# Patient Record
Sex: MALE | Race: WHITE | Employment: FULL TIME | URBAN - METROPOLITAN AREA
[De-identification: names, ages, dates, MRNs, and addresses within clinical notes are randomized per-mention and may not be internally consistent; named-entity substitution may affect disease eponyms.]

---

## 2019-07-08 ENCOUNTER — ANESTHESIA EVENT (OUTPATIENT)
Dept: SURGERY | Age: 59
End: 2019-07-08
Payer: COMMERCIAL

## 2019-07-09 ENCOUNTER — HOSPITAL ENCOUNTER (OUTPATIENT)
Age: 59
Setting detail: OUTPATIENT SURGERY
Discharge: HOME | End: 2019-07-09
Attending: ORTHOPAEDIC SURGERY | Admitting: ORTHOPAEDIC SURGERY
Payer: COMMERCIAL

## 2019-07-09 ENCOUNTER — ANESTHESIA (OUTPATIENT)
Dept: SURGERY | Age: 59
End: 2019-07-09
Payer: COMMERCIAL

## 2019-07-09 VITALS
WEIGHT: 215 LBS | DIASTOLIC BLOOD PRESSURE: 91 MMHG | HEART RATE: 55 BPM | TEMPERATURE: 97.7 F | RESPIRATION RATE: 15 BRPM | HEIGHT: 74 IN | SYSTOLIC BLOOD PRESSURE: 167 MMHG | BODY MASS INDEX: 27.59 KG/M2 | OXYGEN SATURATION: 95 %

## 2019-07-09 DIAGNOSIS — L76.82 PAIN AT SURGICAL INCISION: Primary | ICD-10-CM

## 2019-07-09 PROCEDURE — 77030003666 HC NDL SPINAL BD -A: Performed by: ORTHOPAEDIC SURGERY

## 2019-07-09 PROCEDURE — A4565 SLINGS: HCPCS | Performed by: ORTHOPAEDIC SURGERY

## 2019-07-09 PROCEDURE — 77030004451 HC BUR SHV S&N -B: Performed by: ORTHOPAEDIC SURGERY

## 2019-07-09 PROCEDURE — 74011250636 HC RX REV CODE- 250/636: Performed by: ORTHOPAEDIC SURGERY

## 2019-07-09 PROCEDURE — 76942 ECHO GUIDE FOR BIOPSY: CPT | Performed by: ORTHOPAEDIC SURGERY

## 2019-07-09 PROCEDURE — 76010010054 HC POST OP PAIN BLOCK: Performed by: ORTHOPAEDIC SURGERY

## 2019-07-09 PROCEDURE — 77030008462 HC STPLR SKN PROX J&J -A: Performed by: ORTHOPAEDIC SURGERY

## 2019-07-09 PROCEDURE — 77030010509 HC AIRWY LMA MSK TELE -A: Performed by: ANESTHESIOLOGY

## 2019-07-09 PROCEDURE — 74011000250 HC RX REV CODE- 250

## 2019-07-09 PROCEDURE — 76060000032 HC ANESTHESIA 0.5 TO 1 HR: Performed by: ORTHOPAEDIC SURGERY

## 2019-07-09 PROCEDURE — 77030018836 HC SOL IRR NACL ICUM -A: Performed by: ORTHOPAEDIC SURGERY

## 2019-07-09 PROCEDURE — 74011250636 HC RX REV CODE- 250/636: Performed by: ANESTHESIOLOGY

## 2019-07-09 PROCEDURE — 76210000063 HC OR PH I REC FIRST 0.5 HR: Performed by: ORTHOPAEDIC SURGERY

## 2019-07-09 PROCEDURE — 77030006891 HC BLD SHV RESECT STRY -B: Performed by: ORTHOPAEDIC SURGERY

## 2019-07-09 PROCEDURE — 77030033005 HC TBNG ARTHSC PMP STRY -B: Performed by: ORTHOPAEDIC SURGERY

## 2019-07-09 PROCEDURE — 77030002916 HC SUT ETHLN J&J -A: Performed by: ORTHOPAEDIC SURGERY

## 2019-07-09 PROCEDURE — 76010000160 HC OR TIME 0.5 TO 1 HR INTENSV-TIER 1: Performed by: ORTHOPAEDIC SURGERY

## 2019-07-09 PROCEDURE — 77030003602 HC NDL NRV BLK BBMI -B: Performed by: ANESTHESIOLOGY

## 2019-07-09 PROCEDURE — 74011250636 HC RX REV CODE- 250/636

## 2019-07-09 PROCEDURE — 77030027384 HC PRB ARTHSCP SERFAS STRY -C: Performed by: ORTHOPAEDIC SURGERY

## 2019-07-09 PROCEDURE — 77030032490 HC SLV COMPR SCD KNE COVD -B: Performed by: ORTHOPAEDIC SURGERY

## 2019-07-09 PROCEDURE — 77030006868 HC BLD SHV AUG STRY -B: Performed by: ORTHOPAEDIC SURGERY

## 2019-07-09 PROCEDURE — 74011250637 HC RX REV CODE- 250/637: Performed by: ANESTHESIOLOGY

## 2019-07-09 PROCEDURE — 76210000020 HC REC RM PH II FIRST 0.5 HR: Performed by: ORTHOPAEDIC SURGERY

## 2019-07-09 RX ORDER — FENTANYL CITRATE 50 UG/ML
100 INJECTION, SOLUTION INTRAMUSCULAR; INTRAVENOUS ONCE
Status: COMPLETED | OUTPATIENT
Start: 2019-07-09 | End: 2019-07-09

## 2019-07-09 RX ORDER — LIDOCAINE HYDROCHLORIDE 20 MG/ML
INJECTION, SOLUTION EPIDURAL; INFILTRATION; INTRACAUDAL; PERINEURAL AS NEEDED
Status: DISCONTINUED | OUTPATIENT
Start: 2019-07-09 | End: 2019-07-09 | Stop reason: HOSPADM

## 2019-07-09 RX ORDER — MIDAZOLAM HYDROCHLORIDE 1 MG/ML
2 INJECTION, SOLUTION INTRAMUSCULAR; INTRAVENOUS
Status: DISCONTINUED | OUTPATIENT
Start: 2019-07-09 | End: 2019-07-09 | Stop reason: HOSPADM

## 2019-07-09 RX ORDER — PRAVASTATIN SODIUM 10 MG/1
TABLET ORAL
COMMUNITY

## 2019-07-09 RX ORDER — MIDAZOLAM HYDROCHLORIDE 1 MG/ML
2 INJECTION, SOLUTION INTRAMUSCULAR; INTRAVENOUS ONCE
Status: COMPLETED | OUTPATIENT
Start: 2019-07-09 | End: 2019-07-09

## 2019-07-09 RX ORDER — PROPOFOL 10 MG/ML
INJECTION, EMULSION INTRAVENOUS AS NEEDED
Status: DISCONTINUED | OUTPATIENT
Start: 2019-07-09 | End: 2019-07-09 | Stop reason: HOSPADM

## 2019-07-09 RX ORDER — CEFAZOLIN SODIUM/WATER 2 G/20 ML
2 SYRINGE (ML) INTRAVENOUS ONCE
Status: COMPLETED | OUTPATIENT
Start: 2019-07-09 | End: 2019-07-09

## 2019-07-09 RX ORDER — SODIUM CHLORIDE, SODIUM LACTATE, POTASSIUM CHLORIDE, CALCIUM CHLORIDE 600; 310; 30; 20 MG/100ML; MG/100ML; MG/100ML; MG/100ML
25 INJECTION, SOLUTION INTRAVENOUS CONTINUOUS
Status: DISCONTINUED | OUTPATIENT
Start: 2019-07-09 | End: 2019-07-09 | Stop reason: HOSPADM

## 2019-07-09 RX ORDER — EPINEPHRINE 1 MG/ML
INJECTION INTRAMUSCULAR; INTRAVENOUS; SUBCUTANEOUS AS NEEDED
Status: DISCONTINUED | OUTPATIENT
Start: 2019-07-09 | End: 2019-07-09 | Stop reason: HOSPADM

## 2019-07-09 RX ORDER — HYDROMORPHONE HYDROCHLORIDE 2 MG/ML
0.5 INJECTION, SOLUTION INTRAMUSCULAR; INTRAVENOUS; SUBCUTANEOUS
Status: DISCONTINUED | OUTPATIENT
Start: 2019-07-09 | End: 2019-07-09 | Stop reason: HOSPADM

## 2019-07-09 RX ORDER — ROPIVACAINE HYDROCHLORIDE 5 MG/ML
INJECTION, SOLUTION EPIDURAL; INFILTRATION; PERINEURAL
Status: COMPLETED | OUTPATIENT
Start: 2019-07-09 | End: 2019-07-09

## 2019-07-09 RX ORDER — ONDANSETRON 2 MG/ML
INJECTION INTRAMUSCULAR; INTRAVENOUS AS NEEDED
Status: DISCONTINUED | OUTPATIENT
Start: 2019-07-09 | End: 2019-07-09 | Stop reason: HOSPADM

## 2019-07-09 RX ORDER — ONDANSETRON 2 MG/ML
4 INJECTION INTRAMUSCULAR; INTRAVENOUS
Status: DISCONTINUED | OUTPATIENT
Start: 2019-07-09 | End: 2019-07-09 | Stop reason: HOSPADM

## 2019-07-09 RX ORDER — OXYCODONE HYDROCHLORIDE 5 MG/1
5 TABLET ORAL
Status: COMPLETED | OUTPATIENT
Start: 2019-07-09 | End: 2019-07-09

## 2019-07-09 RX ORDER — NALOXONE HYDROCHLORIDE 0.4 MG/ML
0.2 INJECTION, SOLUTION INTRAMUSCULAR; INTRAVENOUS; SUBCUTANEOUS
Status: DISCONTINUED | OUTPATIENT
Start: 2019-07-09 | End: 2019-07-09 | Stop reason: HOSPADM

## 2019-07-09 RX ORDER — NALOXONE HYDROCHLORIDE 0.4 MG/ML
0.2 INJECTION, SOLUTION INTRAMUSCULAR; INTRAVENOUS; SUBCUTANEOUS AS NEEDED
Status: DISCONTINUED | OUTPATIENT
Start: 2019-07-09 | End: 2019-07-09 | Stop reason: HOSPADM

## 2019-07-09 RX ORDER — OXYCODONE AND ACETAMINOPHEN 5; 325 MG/1; MG/1
1 TABLET ORAL
Qty: 24 TAB | Refills: 0 | Status: SHIPPED | OUTPATIENT
Start: 2019-07-09 | End: 2019-07-12

## 2019-07-09 RX ORDER — ZINC GLUCONATE 50 MG
1 TABLET ORAL DAILY
COMMUNITY

## 2019-07-09 RX ORDER — SODIUM CHLORIDE, SODIUM LACTATE, POTASSIUM CHLORIDE, CALCIUM CHLORIDE 600; 310; 30; 20 MG/100ML; MG/100ML; MG/100ML; MG/100ML
75 INJECTION, SOLUTION INTRAVENOUS CONTINUOUS
Status: DISCONTINUED | OUTPATIENT
Start: 2019-07-09 | End: 2019-07-09 | Stop reason: HOSPADM

## 2019-07-09 RX ORDER — DEXAMETHASONE SODIUM PHOSPHATE 4 MG/ML
INJECTION, SOLUTION INTRA-ARTICULAR; INTRALESIONAL; INTRAMUSCULAR; INTRAVENOUS; SOFT TISSUE
Status: COMPLETED | OUTPATIENT
Start: 2019-07-09 | End: 2019-07-09

## 2019-07-09 RX ORDER — LIDOCAINE HYDROCHLORIDE 10 MG/ML
0.1 INJECTION INFILTRATION; PERINEURAL AS NEEDED
Status: DISCONTINUED | OUTPATIENT
Start: 2019-07-09 | End: 2019-07-09 | Stop reason: HOSPADM

## 2019-07-09 RX ADMIN — DEXAMETHASONE SODIUM PHOSPHATE 5 MG: 4 INJECTION, SOLUTION INTRA-ARTICULAR; INTRALESIONAL; INTRAMUSCULAR; INTRAVENOUS; SOFT TISSUE at 12:50

## 2019-07-09 RX ADMIN — OXYCODONE HYDROCHLORIDE 5 MG: 5 TABLET ORAL at 15:16

## 2019-07-09 RX ADMIN — LIDOCAINE HYDROCHLORIDE 80 MG: 20 INJECTION, SOLUTION EPIDURAL; INFILTRATION; INTRACAUDAL; PERINEURAL at 14:03

## 2019-07-09 RX ADMIN — PROPOFOL 200 MG: 10 INJECTION, EMULSION INTRAVENOUS at 14:03

## 2019-07-09 RX ADMIN — SODIUM CHLORIDE, SODIUM LACTATE, POTASSIUM CHLORIDE, AND CALCIUM CHLORIDE 25 ML/HR: 600; 310; 30; 20 INJECTION, SOLUTION INTRAVENOUS at 12:30

## 2019-07-09 RX ADMIN — FENTANYL CITRATE 50 MCG: 50 INJECTION INTRAMUSCULAR; INTRAVENOUS at 12:47

## 2019-07-09 RX ADMIN — ONDANSETRON 4 MG: 2 INJECTION INTRAMUSCULAR; INTRAVENOUS at 14:41

## 2019-07-09 RX ADMIN — MIDAZOLAM HYDROCHLORIDE 2 MG: 2 INJECTION, SOLUTION INTRAMUSCULAR; INTRAVENOUS at 12:47

## 2019-07-09 RX ADMIN — Medication 2 G: at 14:12

## 2019-07-09 RX ADMIN — HYDROMORPHONE HYDROCHLORIDE 0.5 MG: 2 INJECTION INTRAMUSCULAR; INTRAVENOUS; SUBCUTANEOUS at 15:08

## 2019-07-09 RX ADMIN — ROPIVACAINE HYDROCHLORIDE 25 ML: 5 INJECTION, SOLUTION EPIDURAL; INFILTRATION; PERINEURAL at 12:50

## 2019-07-09 NOTE — ANESTHESIA PROCEDURE NOTES
Peripheral Block    Start time: 7/9/2019 12:47 PM  End time: 7/9/2019 12:50 PM  Performed by: Jacoby Thao MD  Authorized by: Jacoby Thao MD       Pre-procedure: Indications: at surgeon's request and post-op pain management    Preanesthetic Checklist: patient identified, risks and benefits discussed, site marked, timeout performed, anesthesia consent given and patient being monitored    Timeout Time: 12:47          Block Type:   Block Type:   Interscalene  Laterality:  Right  Monitoring:  Standard ASA monitoring, responsive to questions, continuous pulse ox, oxygen, frequent vital sign checks and heart rate  Injection Technique:  Single shot  Procedures: ultrasound guided and nerve stimulator    Patient Position: supine  Prep: chlorhexidine    Location:  Interscalene  Needle Type:  Stimuplex  Needle Gauge:  22 G  Needle Localization:  Nerve stimulator and ultrasound guidance  Motor Response: minimal motor response >0.4 mA      Assessment:  Number of attempts:  1  Injection Assessment:  Incremental injection every 5 mL, negative aspiration for CSF, ultrasound image on chart, no paresthesia, local visualized surrounding nerve on ultrasound, negative aspiration for blood and no intravascular symptoms  Patient tolerance:  Patient tolerated the procedure well with no immediate complications

## 2019-07-09 NOTE — ANESTHESIA PREPROCEDURE EVALUATION
Relevant Problems   No relevant active problems       Anesthetic History   No history of anesthetic complications            Review of Systems / Medical History  Patient summary reviewed and pertinent labs reviewed    Pulmonary          Smoker (37.5 pk years)  Asthma : well controlled       Neuro/Psych   Within defined limits           Cardiovascular                  Exercise tolerance: >4 METS  Comments: Denies CP, SOB or changes in functional status   GI/Hepatic/Renal  Within defined limits              Endo/Other  Within defined limits           Other Findings              Physical Exam    Airway  Mallampati: II  TM Distance: 4 - 6 cm  Neck ROM: normal range of motion   Mouth opening: Normal     Cardiovascular    Rhythm: regular  Rate: normal         Dental    Dentition: Poor dentition and Upper partial plate     Pulmonary  Breath sounds clear to auscultation               Abdominal  GI exam deferred       Other Findings            Anesthetic Plan    ASA: 2  Anesthesia type: general      Post-op pain plan if not by surgeon: peripheral nerve block single    Induction: Intravenous  Anesthetic plan and risks discussed with: Patient and Spouse

## 2019-07-09 NOTE — DISCHARGE INSTRUCTIONS
Post-Operative Instructions   For   [facility]  Shoulder Arthroscopy  Phone:  [phone]    1. Apply ice to the shoulder as needed. 2.   Keep dressings in place for the first two days. After two days you  may remove the dressings. Keep the stab wounds clean. The small stitches will be removed when you come to the office. You can cover them with small band aids. 3. You may discontinue use of your sling and begin active range of motion of the shoulder as tolerated by pain, unless you are instructed otherwise. 4. You may shower after the dressing comes off. Cover the small wounds with  waterproof band aids. 5. Use  pain medication as instructed on the bottle. If you have pain medication from another physcian do not use it with this medication. 6. You may have some side effects from your pain medication. If you have nausea, try taking your medication with food. For itching, you may take over the counter Benadryl. 7. If you have a problem, please call [facility] at [phone]    Mahnaz Magdaleno M.D. [employer] ACTIVITY  · As tolerated and as directed by your doctor. · Bathe or shower as directed by your doctor. DIET  · Clear liquids until no nausea or vomiting; then light diet for the first day. · Advance to regular diet on second day, unless your doctor orders otherwise. · If nausea and vomiting continues, call your doctor. PAIN  · Take pain medication as directed by your doctor. · Call your doctor if pain is NOT relieved by medication. · DO NOT take aspirin of blood thinners unless directed by your doctor.      DRESSING CARE       CALL YOUR DOCTOR IF   · Excessive bleeding that does not stop after holding pressure over the area  · Temperature of 101 degrees F or above  · Excessive redness, swelling or bruising, and/ or green or yellow, smelly discharge from incision    AFTER ANESTHESIA   · For the first 24 hours: DO NOT Drive, Drink alcoholic beverages, or Make important decisions. · Be aware of dizziness following anesthesia and while taking pain medication. APPOINTMENT DATE/ TIME    YOUR DOCTOR'S PHONE NUMBER       DISCHARGE SUMMARY from Nurse    PATIENT INSTRUCTIONS:    After general anesthesia or intravenous sedation, for 24 hours or while taking prescription Narcotics:  · Limit your activities  · Do not drive and operate hazardous machinery  · Do not make important personal or business decisions  · Do  not drink alcoholic beverages  · If you have not urinated within 8 hours after discharge, please contact your surgeon on call. *  Please give a list of your current medications to your Primary Care Provider. *  Please update this list whenever your medications are discontinued, doses are      changed, or new medications (including over-the-counter products) are added. *  Please carry medication information at all times in case of emergency situations. These are general instructions for a healthy lifestyle:    No smoking/ No tobacco products/ Avoid exposure to second hand smoke    Surgeon General's Warning:  Quitting smoking now greatly reduces serious risk to your health. Obesity, smoking, and sedentary lifestyle greatly increases your risk for illness    A healthy diet, regular physical exercise & weight monitoring are important for maintaining a healthy lifestyle    You may be retaining fluid if you have a history of heart failure or if you experience any of the following symptoms:  Weight gain of 3 pounds or more overnight or 5 pounds in a week, increased swelling in our hands or feet or shortness of breath while lying flat in bed. Please call your doctor as soon as you notice any of these symptoms; do not wait until your next office visit.     Recognize signs and symptoms of STROKE:    F-face looks uneven    A-arms unable to move or move unevenly    S-speech slurred or non-existent    T-time-call 911 as soon as signs and symptoms begin-DO NOT go       Back to bed or wait to see if you get better-TIME IS BRAIN.

## 2019-07-09 NOTE — BRIEF OP NOTE
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

## 2019-07-09 NOTE — H&P
Outpatient Surgery History and Physical:  Soumya Wiggins was seen and examined. CHIEF COMPLAINT:   Right shoulder. PE:     Visit Vitals  /73 (BP 1 Location: Left arm, BP Patient Position: At rest)   Pulse (!) 58   Temp 98.6 °F (37 °C)   Resp 18   Ht 6' 2\" (1.88 m)   Wt 97.5 kg (215 lb)   SpO2 98%   BMI 27.60 kg/m²       Heart:   Regular rhythm      Lungs:  Are clear      Past Medical History: There are no active problems to display for this patient. Surgical History:   Past Surgical History:   Procedure Laterality Date    HX ORTHOPAEDIC Left last one 2008    shoulder surg x 2    HX ORTHOPAEDIC Bilateral prior to 2008    toe surg     HX OTHER SURGICAL      colonoscopy       Social History: Patient  reports that he quit smoking about 15 years ago. He has a 37.50 pack-year smoking history. He has never used smokeless tobacco. He reports that he drinks alcohol. He reports that he does not use drugs. Family History:   Family History   Problem Relation Age of Onset    Diabetes Father        Allergies: Reviewed per EMR  No Known Allergies    Medications:    No current facility-administered medications on file prior to encounter. Current Outpatient Medications on File Prior to Encounter   Medication Sig    gluc colón/chondro colón A/vit C/Mn (GLUCOSAMINE 1500 COMPLEX PO) Take 1 Tab by mouth daily.  POTASSIUM-99 PO Take 99 mg by mouth daily.  Magnesium Oxide 500 mg cap Take 1 Cap by mouth daily.  pravastatin (PRAVACHOL) 10 mg tablet Take  by mouth nightly.  multivitamin (ONE A DAY) tablet take 1 Tab by mouth daily. The surgery is planned for the right shoulder DCR debridement. The patient is here today for outpatient surgery. I have examined the patient, no changes are noted in the patient's medical status. Necessity for the procedure/care is still present and the history and physical above is current.   See the office notes for the full long term history of the problem. Please see the recent office notes for the musculoskeletal examination.     Signed By: Yohannes Abraham MD     July 9, 2019 1:35 PM

## 2019-07-09 NOTE — OP NOTES
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 PROCEDURE IN DETAIL:   The patient's right   shoulder  was prepped and draped in a sterile fashion. The arthroscope was inserted through a posterior portal  and the interior of the joint was examined. The labrum was intact. The biceps tendon was torn. It was debrided and released with the shaver and the baskets. The articular surfaces were normal. There was not a tear of the rotator cuff. The scope was then removed from the shoulder joint and then placed into the subacromial space. The bursal tissue was removed and visualization was obtained. The anterior acromion was prominent. A subacromial decompression was performed through the posterior portal using a cutting block technique. This provided a good decompression of the subacromial space. The rotator cuff was examined and was normal.           The distal clavicle was very degenerative. A distal clavicle resection was done through the anterior portal. The arthroscopic samia was used to remove approximately 1 centimeter of the bone. The stab wounds were then closed with simple nylon sutures. A sterile dressing was applied. A sling was placed as well. The patient was then taken to the recovery room in satisfactory condition.           Rupert Crews M.D.

## 2019-07-09 NOTE — ANESTHESIA POSTPROCEDURE EVALUATION
Procedure(s):  RIGHT SHOULDER ARTHROSCOPY  RIGHT CLAVICLE RESECTION AND LIMITED DEBRIDEMENT/SUBACROMIAL DECOMPRESSION. general    Anesthesia Post Evaluation      Multimodal analgesia: multimodal analgesia used between 6 hours prior to anesthesia start to PACU discharge  Patient location during evaluation: bedside  Patient participation: complete - patient participated  Level of consciousness: awake and alert  Pain management: adequate  Airway patency: patent  Anesthetic complications: no  Cardiovascular status: hemodynamically stable  Respiratory status: spontaneous ventilation  Hydration status: euvolemic  Comments: Patient stable and may discharge at this time. Vitals Value Taken Time   /93 7/9/2019  3:25 PM   Temp 36.5 °C (97.7 °F) 7/9/2019  2:57 PM   Pulse 55 7/9/2019  3:27 PM   Resp 13 7/9/2019  3:27 PM   SpO2 93 % 7/9/2019  3:27 PM   Vitals shown include unvalidated device data.

## (undated) DEVICE — SUT ETHLN 3-0 18IN PS2 BLK --

## (undated) DEVICE — [AUGER BUR, ARTHROSCOPIC SHAVER BLADE,  DO NOT RESTERILIZE,  DO NOT USE IF PACKAGE IS DAMAGED,  KEEP DRY,  KEEP AWAY FROM SUNLIGHT]: Brand: FORMULA

## (undated) DEVICE — STOCKINETTE ORTH W9XL36IN COT 2 PLY HLLW FOR HANDLING LMB

## (undated) DEVICE — REM POLYHESIVE ADULT PATIENT RETURN ELECTRODE: Brand: VALLEYLAB

## (undated) DEVICE — BLADE SHV DIA4MM RED RESECT FOR GEN DEB REM OF PERIOST FR

## (undated) DEVICE — SOLUTION IRRIG 3000ML 0.9% SOD CHL FLX CONT 0797208] ICU MEDICAL INC]

## (undated) DEVICE — BUTTON SWITCH PENCIL BLADE ELECTRODE, HOLSTER: Brand: EDGE

## (undated) DEVICE — RESTRAINT UNIVERSAL HEAD DISP --

## (undated) DEVICE — (D)PREP SKN CHLRAPRP APPL 26ML -- CONVERT TO ITEM 371833

## (undated) DEVICE — T-DRAPE,EXTREMITY,STERILE: Brand: MEDLINE

## (undated) DEVICE — IMPERVIOUS STOCKINETTE: Brand: DEROYAL

## (undated) DEVICE — SURGICAL PROCEDURE PACK BASIC ST FRANCIS

## (undated) DEVICE — 2000CC GUARDIAN II: Brand: GUARDIAN

## (undated) DEVICE — OUTFLOW CASSETTE TUBING, DO NOT USE IF PACKAGE IS DAMAGED: Brand: CROSSFLOW

## (undated) DEVICE — Device

## (undated) DEVICE — 90-S ACCELERATOR, SUCTION PROBE, NON-BENDABLE, MAX CUT LEVEL 11: Brand: SERFAS ENERGY

## (undated) DEVICE — KENDALL SCD EXPRESS SLEEVES, KNEE LENGTH, MEDIUM: Brand: KENDALL SCD

## (undated) DEVICE — 3M™ COBAN™ SELF-ADHERENT WRAP, 1586S, STERILE, 6 IN X 5 YD (15 CM X 4,5 M), 12 ROLLS/CASE: Brand: 3M™ COBAN™

## (undated) DEVICE — NDL SPNE QNCKE 18GX3.5IN LF --

## (undated) DEVICE — ABDOMINAL PAD: Brand: DERMACEA

## (undated) DEVICE — 4.0 MM ELITE NOTCHBLASTER STRAIGHT                                    DISPOSABLE BURRS, LILAC, 10000                                    MAXIMUM RPM, PACKAGED 6 PER BOX, STERILE

## (undated) DEVICE — SLING ARM ENV PD DLX LG BLU --

## (undated) DEVICE — INFLOW CASSETTE TUBING, DO NOT USE IF PACKAGE IS DAMAGED: Brand: CROSSFLOW

## (undated) DEVICE — CURITY ABDOMINAL PAD: Brand: CURITY

## (undated) DEVICE — DRAPE,SHOULDER,BEACH CHAIR,STERILE: Brand: MEDLINE

## (undated) DEVICE — AMD ANTIMICROBIAL GAUZE SPONGES,12 PLY USP TYPE VII, 0.2% POLYHEXAMETHYLENE BIGUANIDE HCI (PHMB): Brand: CURITY